# Patient Record
Sex: FEMALE | Race: WHITE
[De-identification: names, ages, dates, MRNs, and addresses within clinical notes are randomized per-mention and may not be internally consistent; named-entity substitution may affect disease eponyms.]

---

## 2018-02-27 ENCOUNTER — HOSPITAL ENCOUNTER (OUTPATIENT)
Dept: HOSPITAL 75 - RAD | Age: 28
End: 2018-02-27
Attending: OBSTETRICS & GYNECOLOGY
Payer: COMMERCIAL

## 2018-02-27 DIAGNOSIS — Z36.89: Primary | ICD-10-CM

## 2018-02-27 DIAGNOSIS — Z3A.19: ICD-10-CM

## 2018-02-27 PROCEDURE — 76805 OB US >/= 14 WKS SNGL FETUS: CPT

## 2018-02-27 NOTE — DIAGNOSTIC IMAGING REPORT
INDICATION: Fetal survey.



TECHNIQUE: Multiple real-time grayscale images were obtained over

the gravid uterus.



COMPARISON: None.



FINDINGS: There is a single live fetus in a breech presentation.

The placenta is anterior. The amniotic fluid volume is normal.

Fetal cardiac activity is measured at 150 beats per minute.

Cervical length is 8.4 cm. Fetal kidneys, bladder, and stomach

are unremarkable. Intracranial structures are unremarkable. There

is a four-chamber heart. There is a three-vessel cord with normal

cord insertion. The fetal spine is unremarkable.



Biometrical measurements are as follows:

Biparietal 4.4 cm, age 19 weeks 3 days.

Head circumference 17.2 cm, age 19 weeks 6 days.

Abdominal circumference 14.0 cm, age 19 weeks 3 days.

Femur length 3.2 cm, age 20 weeks 0 days.



Sonographic estimate age: 19 weeks 5 days.

Sonographic estimated date of delivery: 07/19/18.



Estimated Fetal Weight: 304 gm (+/- 44  gm).

LMP percentile: 20%.



Fetal heart rate: 150 beats per minute.



Fetal number: 1 of 1.



IMPRESSION: Single live IUP at approximately 19 weeks 5 days

gestational age. The estimated date of confinement

sonographically is 07/19/2018.



Dictated by: 



  Dictated on workstation # [id number]

## 2018-07-04 ENCOUNTER — HOSPITAL ENCOUNTER (INPATIENT)
Dept: HOSPITAL 75 - LDRP | Age: 28
LOS: 2 days | Discharge: HOME | End: 2018-07-06
Attending: OBSTETRICS & GYNECOLOGY | Admitting: OBSTETRICS & GYNECOLOGY
Payer: COMMERCIAL

## 2018-07-04 VITALS — SYSTOLIC BLOOD PRESSURE: 129 MMHG | DIASTOLIC BLOOD PRESSURE: 63 MMHG

## 2018-07-04 VITALS — DIASTOLIC BLOOD PRESSURE: 57 MMHG | SYSTOLIC BLOOD PRESSURE: 105 MMHG

## 2018-07-04 VITALS — WEIGHT: 150 LBS | BODY MASS INDEX: 27.6 KG/M2 | HEIGHT: 62 IN

## 2018-07-04 VITALS — DIASTOLIC BLOOD PRESSURE: 62 MMHG | SYSTOLIC BLOOD PRESSURE: 100 MMHG

## 2018-07-04 VITALS — SYSTOLIC BLOOD PRESSURE: 117 MMHG | DIASTOLIC BLOOD PRESSURE: 78 MMHG

## 2018-07-04 VITALS — SYSTOLIC BLOOD PRESSURE: 109 MMHG | DIASTOLIC BLOOD PRESSURE: 56 MMHG

## 2018-07-04 VITALS — SYSTOLIC BLOOD PRESSURE: 115 MMHG | DIASTOLIC BLOOD PRESSURE: 65 MMHG

## 2018-07-04 VITALS — SYSTOLIC BLOOD PRESSURE: 112 MMHG | DIASTOLIC BLOOD PRESSURE: 58 MMHG

## 2018-07-04 VITALS — SYSTOLIC BLOOD PRESSURE: 107 MMHG | DIASTOLIC BLOOD PRESSURE: 60 MMHG

## 2018-07-04 VITALS — DIASTOLIC BLOOD PRESSURE: 58 MMHG | SYSTOLIC BLOOD PRESSURE: 108 MMHG

## 2018-07-04 VITALS — DIASTOLIC BLOOD PRESSURE: 59 MMHG | SYSTOLIC BLOOD PRESSURE: 110 MMHG

## 2018-07-04 VITALS — DIASTOLIC BLOOD PRESSURE: 62 MMHG | SYSTOLIC BLOOD PRESSURE: 101 MMHG

## 2018-07-04 VITALS — DIASTOLIC BLOOD PRESSURE: 56 MMHG | SYSTOLIC BLOOD PRESSURE: 112 MMHG

## 2018-07-04 VITALS — SYSTOLIC BLOOD PRESSURE: 111 MMHG | DIASTOLIC BLOOD PRESSURE: 53 MMHG

## 2018-07-04 VITALS — DIASTOLIC BLOOD PRESSURE: 68 MMHG | SYSTOLIC BLOOD PRESSURE: 114 MMHG

## 2018-07-04 VITALS — SYSTOLIC BLOOD PRESSURE: 100 MMHG | DIASTOLIC BLOOD PRESSURE: 62 MMHG

## 2018-07-04 VITALS — SYSTOLIC BLOOD PRESSURE: 114 MMHG | DIASTOLIC BLOOD PRESSURE: 57 MMHG

## 2018-07-04 VITALS — SYSTOLIC BLOOD PRESSURE: 107 MMHG | DIASTOLIC BLOOD PRESSURE: 59 MMHG

## 2018-07-04 VITALS — SYSTOLIC BLOOD PRESSURE: 111 MMHG | DIASTOLIC BLOOD PRESSURE: 64 MMHG

## 2018-07-04 VITALS — SYSTOLIC BLOOD PRESSURE: 111 MMHG | DIASTOLIC BLOOD PRESSURE: 61 MMHG

## 2018-07-04 VITALS — SYSTOLIC BLOOD PRESSURE: 108 MMHG | DIASTOLIC BLOOD PRESSURE: 70 MMHG

## 2018-07-04 VITALS — DIASTOLIC BLOOD PRESSURE: 61 MMHG | SYSTOLIC BLOOD PRESSURE: 115 MMHG

## 2018-07-04 VITALS — DIASTOLIC BLOOD PRESSURE: 57 MMHG | SYSTOLIC BLOOD PRESSURE: 114 MMHG

## 2018-07-04 VITALS — SYSTOLIC BLOOD PRESSURE: 109 MMHG | DIASTOLIC BLOOD PRESSURE: 58 MMHG

## 2018-07-04 VITALS — DIASTOLIC BLOOD PRESSURE: 58 MMHG | SYSTOLIC BLOOD PRESSURE: 111 MMHG

## 2018-07-04 VITALS — SYSTOLIC BLOOD PRESSURE: 113 MMHG | DIASTOLIC BLOOD PRESSURE: 57 MMHG

## 2018-07-04 VITALS — DIASTOLIC BLOOD PRESSURE: 65 MMHG | SYSTOLIC BLOOD PRESSURE: 112 MMHG

## 2018-07-04 VITALS — DIASTOLIC BLOOD PRESSURE: 56 MMHG | SYSTOLIC BLOOD PRESSURE: 111 MMHG

## 2018-07-04 VITALS — SYSTOLIC BLOOD PRESSURE: 107 MMHG | DIASTOLIC BLOOD PRESSURE: 66 MMHG

## 2018-07-04 VITALS — DIASTOLIC BLOOD PRESSURE: 59 MMHG | SYSTOLIC BLOOD PRESSURE: 111 MMHG

## 2018-07-04 VITALS — DIASTOLIC BLOOD PRESSURE: 60 MMHG | SYSTOLIC BLOOD PRESSURE: 107 MMHG

## 2018-07-04 VITALS — SYSTOLIC BLOOD PRESSURE: 110 MMHG | DIASTOLIC BLOOD PRESSURE: 64 MMHG

## 2018-07-04 VITALS — DIASTOLIC BLOOD PRESSURE: 60 MMHG | SYSTOLIC BLOOD PRESSURE: 111 MMHG

## 2018-07-04 VITALS — DIASTOLIC BLOOD PRESSURE: 57 MMHG | SYSTOLIC BLOOD PRESSURE: 110 MMHG

## 2018-07-04 VITALS — DIASTOLIC BLOOD PRESSURE: 58 MMHG | SYSTOLIC BLOOD PRESSURE: 107 MMHG

## 2018-07-04 VITALS — SYSTOLIC BLOOD PRESSURE: 111 MMHG | DIASTOLIC BLOOD PRESSURE: 60 MMHG

## 2018-07-04 VITALS — SYSTOLIC BLOOD PRESSURE: 120 MMHG | DIASTOLIC BLOOD PRESSURE: 59 MMHG

## 2018-07-04 VITALS — DIASTOLIC BLOOD PRESSURE: 57 MMHG | SYSTOLIC BLOOD PRESSURE: 115 MMHG

## 2018-07-04 DIAGNOSIS — Z3A.38: ICD-10-CM

## 2018-07-04 LAB
BASOPHILS # BLD AUTO: 0 10^3/UL (ref 0–0.1)
BASOPHILS NFR BLD AUTO: 0 % (ref 0–10)
EOSINOPHIL # BLD AUTO: 0 10^3/UL (ref 0–0.3)
EOSINOPHIL NFR BLD AUTO: 0 % (ref 0–10)
ERYTHROCYTE [DISTWIDTH] IN BLOOD BY AUTOMATED COUNT: 13.9 % (ref 10–14.5)
HCT VFR BLD CALC: 38 % (ref 35–52)
HGB BLD-MCNC: 13.4 G/DL (ref 11.5–16)
LYMPHOCYTES # BLD AUTO: 1.6 X 10^3 (ref 1–4)
LYMPHOCYTES NFR BLD AUTO: 9 % (ref 12–44)
MANUAL DIFFERENTIAL PERFORMED BLD QL: YES
MCH RBC QN AUTO: 32 PG (ref 25–34)
MCHC RBC AUTO-ENTMCNC: 35 G/DL (ref 32–36)
MCV RBC AUTO: 92 FL (ref 80–99)
MONOCYTES # BLD AUTO: 0.9 X 10^3 (ref 0–1)
MONOCYTES NFR BLD AUTO: 5 % (ref 0–12)
MONOCYTES NFR BLD: 10 %
NEUTROPHILS # BLD AUTO: 15.2 X 10^3 (ref 1.8–7.8)
NEUTROPHILS NFR BLD AUTO: 86 % (ref 42–75)
NEUTS BAND NFR BLD MANUAL: 78 %
PLATELET # BLD: 242 10^3/UL (ref 130–400)
PMV BLD AUTO: 10.5 FL (ref 7.4–10.4)
RBC # BLD AUTO: 4.16 10^6/UL (ref 4.35–5.85)
RBC MORPH BLD: NORMAL
VARIANT LYMPHS NFR BLD MANUAL: 12 %
WBC # BLD AUTO: 17.7 10^3/UL (ref 4.3–11)

## 2018-07-04 PROCEDURE — 99212 OFFICE O/P EST SF 10 MIN: CPT

## 2018-07-04 PROCEDURE — 86850 RBC ANTIBODY SCREEN: CPT

## 2018-07-04 PROCEDURE — 85025 COMPLETE CBC W/AUTO DIFF WBC: CPT

## 2018-07-04 PROCEDURE — 85007 BL SMEAR W/DIFF WBC COUNT: CPT

## 2018-07-04 PROCEDURE — 88307 TISSUE EXAM BY PATHOLOGIST: CPT

## 2018-07-04 PROCEDURE — 36415 COLL VENOUS BLD VENIPUNCTURE: CPT

## 2018-07-04 PROCEDURE — 86900 BLOOD TYPING SEROLOGIC ABO: CPT

## 2018-07-04 PROCEDURE — 85027 COMPLETE CBC AUTOMATED: CPT

## 2018-07-04 PROCEDURE — 86901 BLOOD TYPING SEROLOGIC RH(D): CPT

## 2018-07-04 RX ADMIN — Medication SCH MLS/HR: at 17:08

## 2018-07-04 RX ADMIN — Medication SCH MLS/HR: at 17:40

## 2018-07-04 RX ADMIN — IBUPROFEN SCH MG: 600 TABLET ORAL at 20:11

## 2018-07-04 NOTE — OB LABOR & DELIVERY RECORD
L&D History


Date of Service


Date of Service:  2018





History


Expected Date of Delivery:  2018


Gestational Age in Weeks:  38


Hx :  1


Hx Para:  0





Pregnancy Complications


Prenatal Events:  Routine Prenatal care


Operative Indications (Cesarea:  N/A-Vaginal Delivery


Intrapartal Events:  None





L&D Stage1


Stage One


Onset of Labor - Date:  2018





Monitors and Tracing


Fetal Monitor Mode:  External


Fetal Monitor Accelerations:  Uniform


Fetal Monitor Decelerations:  Variable


Fetal Station:  -2


Long Term Variability:  Average (6-10)


Short Term Variability:  Present


Fetal Presentation:  Vertex





Rupture of Membranes


Spontaneous Ruture of Membrane:  No


Amniotic Membrane Rupture Time:  14:00


Amniotic Membrane Fluid Desc.:  Clear


Vaginal Bleeding Description:  Normal Show





Induction/Anesthesia


Epidural Cath Placement - Time:  16:00





Progress/Notes


Patient progressed to complete and +2 station with no augmentation other than 

AROM at 6-7 cm.





L&D Stage2


Stage Two


Stage II Date:  2018





Monitors and Tracing


Fetal Monitor Mode:  External


Fetal Monitor Accelerations:  Uniform


Fetal Monitor Decelerations:  Variable


Long Term Variability:  Average (6-10)


Short Term Variability:  Present


Fetal Position:  Right Occiput Anterior


Fetal Presentation:  Vertex





Cord Descript/Complications


Fetal Cord Vessel Description:  3 Vessels





Delivery Type


Infant Delivery Method:  Spontaneous Vaginal


Anterior Shoulder:  Left





Episiotomy/Perineal Laceration


Laceraction(s)/Extensions:  Yes


Episiotomy Description:  Right Mediolateral


Degree (describe repair)


RML repaired using 2-0 and 3-0 vicryl suture





Condition of Infant


Delivery


1 minute Apgar Comment:  


8


5 minute Apgar Comment:  


9


Notes


Live male infant weight 5lbs 14 oz





Condition of Infant


Condition of Infant:  Living


Exam:  No Observed Abnormalities





Resuscitation


Resuscitation:  N/A - Spontaneous Resp





L&D Stage3


Stage Three


Stage III Date:  2018





Pictocin


Pitocin Administration mu/min:  30


Pitocin Administration Comment:  


30 mu wide open at delivery of the placenta





Placenta Delivery


Placenta Delivery:  Spontaneous





Delivery Summary


Summary


Estimated blood loss (mL):  300


Attending at delivery:


Sangita Quevedo DO





Condition of Delivery


Examined:  Cervix Examined, Uterus Explored


Post Partum Hemorrhage:  No


Condition of Mother


stable


Condition of Infant (s)


stable











SANGITA QUEVEDO DO 2018 17:22

## 2018-07-04 NOTE — HISTORY & PHYSICAL-OB
OB - Chief Complaint & HPI


Date/Time


Date of Admission:


Date of Admission:


Time Seen by Provider:  14:20





Chief Complaint/History


OB-Reason for Admission/Chief:  Onset of Labor


Hx :  1


Hx Para:  0


Expected Date of Delivery:  2018


Gestational Age in Weeks:  38


Admission Nurse Assessment Rev:  Yes


History of Labs


A pos


Antibody neg


RI


RPR NR


HBsAg NR


HIV NR


GC neg


GBS neg





Allergies and Home Medications


Allergies


Coded Allergies:  


     codeine (Unverified  Adverse Reaction, Mild, NAUSEA, 4/3/11)





Home Medications


Nitrofurantoin/Nitrofuran Mac 100 Mg Capsule, 1 EACH PO BID, (Reported)


Sulfamethoxazole/Trimethoprim 1 Tab Tablet, 1 TAB PO BID


   Prescribed by: JO-ANN CAVAZOS on 11 0305


[Stacy]  , 1 TAB PO DAILY, (Reported)





Patient Home Medication List


Home Medication List Reviewed:  Yes





OB - History


Hx of Present Pregnancy


Prenatal Care:  Yes


Ultrasounds:  Normal mid trimester US


Obstetrical Complications:  None


Medical Complications:  None





Obstetrical History


Hx :  1


Hx Para:  0





Delivery History


Hx Blood Disorders:  No





Patient Past Medical History





n/a





Social History/Family History


Recent Infectious Disease Expo:  No





OB - Admission Exam


Physical Exam


HEENT:  NCAT


Heart:  Rhythm Normal


Lungs:  Clear


Abdomen:  Gravid


Extremities:  Normal


Reflexes:  Normal


Cervical Dilatation:  5cm


Effacement:  75%


Station:  -1


Membranes:  Intact


Fetal Heart Rate:  130's


Accelerations:  Accelerations Present


Decelerations:  No Decelerations


Short Term Variability:  Present


Long Term Variability:  Average (6-25)


Contractions on Admission:  < 5 Minutes Apart


Intensity:  Moderate


Labs





Laboratory Tests








Test


 18


11:52 Range/Units


 


 


White Blood Count


 17.7 H


 4.3-11.0


10^3/uL


 


Red Blood Count


 4.16 L


 4.35-5.85


10^6/uL


 


Hemoglobin 13.4  11.5-16.0  G/DL


 


Hematocrit 38  35-52  %


 


Mean Corpuscular Volume 92  80-99  FL


 


Mean Corpuscular Hemoglobin 32  25-34  PG


 


Mean Corpuscular Hemoglobin


Concent 35 


 32-36  G/DL





 


Red Cell Distribution Width 13.9  10.0-14.5  %


 


Platelet Count


 242 


 130-400


10^3/uL


 


Mean Platelet Volume 10.5 H 7.4-10.4  FL


 


Neutrophils (%) (Auto) 86 H 42-75  %


 


Lymphocytes (%) (Auto) 9 L 12-44  %


 


Monocytes (%) (Auto) 5  0-12  %


 


Eosinophils (%) (Auto) 0  0-10  %


 


Basophils (%) (Auto) 0  0-10  %


 


Neutrophils # (Auto) 15.2 H 1.8-7.8  X 10^3


 


Lymphocytes # (Auto) 1.6  1.0-4.0  X 10^3


 


Monocytes # (Auto) 0.9  0.0-1.0  X 10^3


 


Eosinophils # (Auto)


 0.0 


 0.0-0.3


10^3/uL


 


Basophils # (Auto)


 0.0 


 0.0-0.1


10^3/uL


 


Neutrophils % (Manual) 78   %


 


Lymphocytes % (Manual) 12   %


 


Monocytes % (Manual) 10   %


 


Blood Morphology Comment NORMAL   











OB - Assessment/Plan/Diagnosis


Assessment


Assessment:  active labor


Admission Dx


28 yo  @ 38 weeks


Active labor


GBS neg


Admission Status:  Inpatient Order (span 2 midnights)


Reason for Inpatient Admission:  


28 yo  @ 38 weeks


Active labor


GBS neg





Plan


Plan:  Expectant Management


Induction Method:  SANGITA REEDER DO 2018 2:24 pm

## 2018-07-05 VITALS — DIASTOLIC BLOOD PRESSURE: 69 MMHG | SYSTOLIC BLOOD PRESSURE: 104 MMHG

## 2018-07-05 VITALS — DIASTOLIC BLOOD PRESSURE: 66 MMHG | SYSTOLIC BLOOD PRESSURE: 101 MMHG

## 2018-07-05 VITALS — SYSTOLIC BLOOD PRESSURE: 94 MMHG | DIASTOLIC BLOOD PRESSURE: 66 MMHG

## 2018-07-05 VITALS — DIASTOLIC BLOOD PRESSURE: 65 MMHG | SYSTOLIC BLOOD PRESSURE: 97 MMHG

## 2018-07-05 VITALS — SYSTOLIC BLOOD PRESSURE: 108 MMHG | DIASTOLIC BLOOD PRESSURE: 68 MMHG

## 2018-07-05 VITALS — DIASTOLIC BLOOD PRESSURE: 59 MMHG | SYSTOLIC BLOOD PRESSURE: 106 MMHG

## 2018-07-05 LAB
BASOPHILS # BLD AUTO: 0 10^3/UL (ref 0–0.1)
BASOPHILS NFR BLD AUTO: 0 % (ref 0–10)
EOSINOPHIL # BLD AUTO: 0.1 10^3/UL (ref 0–0.3)
EOSINOPHIL NFR BLD AUTO: 0 % (ref 0–10)
ERYTHROCYTE [DISTWIDTH] IN BLOOD BY AUTOMATED COUNT: 13.9 % (ref 10–14.5)
HCT VFR BLD CALC: 33 % (ref 35–52)
HGB BLD-MCNC: 11.1 G/DL (ref 11.5–16)
LYMPHOCYTES # BLD AUTO: 2.1 X 10^3 (ref 1–4)
LYMPHOCYTES NFR BLD AUTO: 14 % (ref 12–44)
MANUAL DIFFERENTIAL PERFORMED BLD QL: NO
MCH RBC QN AUTO: 32 PG (ref 25–34)
MCHC RBC AUTO-ENTMCNC: 34 G/DL (ref 32–36)
MCV RBC AUTO: 93 FL (ref 80–99)
MONOCYTES # BLD AUTO: 1 X 10^3 (ref 0–1)
MONOCYTES NFR BLD AUTO: 7 % (ref 0–12)
NEUTROPHILS # BLD AUTO: 11.3 X 10^3 (ref 1.8–7.8)
NEUTROPHILS NFR BLD AUTO: 78 % (ref 42–75)
PLATELET # BLD: 209 10^3/UL (ref 130–400)
PMV BLD AUTO: 10.3 FL (ref 7.4–10.4)
RBC # BLD AUTO: 3.51 10^6/UL (ref 4.35–5.85)
WBC # BLD AUTO: 14.4 10^3/UL (ref 4.3–11)

## 2018-07-05 RX ADMIN — DOCUSATE SODIUM SCH MG: 100 CAPSULE ORAL at 02:40

## 2018-07-05 RX ADMIN — IBUPROFEN SCH MG: 600 TABLET ORAL at 15:12

## 2018-07-05 RX ADMIN — IBUPROFEN SCH MG: 600 TABLET ORAL at 02:40

## 2018-07-05 RX ADMIN — DOCUSATE SODIUM SCH MG: 100 CAPSULE ORAL at 08:53

## 2018-07-05 RX ADMIN — IBUPROFEN SCH MG: 600 TABLET ORAL at 08:54

## 2018-07-05 RX ADMIN — VITAMIN A ACETATE, .BETA.-CAROTENE, ASCORBIC ACID, CHOLECALCIFEROL, .ALPHA.-TOCOPHEROL ACETATE, DL-, THIAMINE MONONITRATE, RIBOFLAVIN, NIACINAMIDE, PYRIDOXINE HYDROCHLORIDE, FOLIC ACID, CYANOCOBALAMIN, CALCIUM CARBONATE, FERROUS FUMARATE, ZINC OXIDE, AND CUPRIC OXIDE SCH EA: 2000; 2000; 120; 400; 22; 1.84; 3; 20; 10; 1; 12; 200; 27; 25; 2 TABLET ORAL at 08:53

## 2018-07-05 RX ADMIN — FERROUS SULFATE TAB 325 MG (65 MG ELEMENTAL FE) SCH MG: 325 (65 FE) TAB at 08:53

## 2018-07-05 RX ADMIN — IBUPROFEN SCH MG: 600 TABLET ORAL at 21:33

## 2018-07-05 RX ADMIN — DOCUSATE SODIUM SCH MG: 100 CAPSULE ORAL at 21:33

## 2018-07-05 NOTE — POSTPARTUM PROGRESS NOTE
Postpartum Note


Postpartum Note


Postpartum Day # 1





Subjective:


Patient is without complaints. Ambulating, voiding. Tolerating a regular diet 

without nausea or vomiting. Normal lochia. Pain is well controlled with oral 

pain medications. breastfeeding





Objective:





Vital Sign - Last 24 Hours








 7/4/18 7/4/18 7/4/18 7/4/18





 11:00 14:03 15:03 15:06


 


Temp 98.0   


 


Pulse 79 70 72 76


 


Resp 18 18 18 18


 


B/P (MAP) 108/70 (83) 100/62 (75) 100/62 (75) 115/61 (79)


 


Pulse Ox   99 92


 


O2 Delivery   Room Air Room Air


 


    





 7/4/18 7/4/18 7/4/18 7/4/18





 15:09 15:12 15:15 15:18


 


Temp    98.0


 


Pulse 75 71 78 80


 


Resp 18 18 18 18


 


B/P (MAP) 112/58 (76) 112/56 (74) 111/53 (72) 114/68 (83)


 


Pulse Ox 99 100 100 90


 


O2 Delivery Room Air Room Air Room Air Room Air


 


    





 7/4/18 7/4/18 7/4/18 7/4/18





 15:21 15:24 15:27 15:30


 


Pulse 76 87 78 75


 


Resp 18 18 18 18


 


B/P (MAP) 110/64 (79) 107/60 (76) 107/58 (74) 105/57 (73)


 


Pulse Ox  100 100 99


 


O2 Delivery Room Air Room Air Room Air Room Air





 7/4/18 7/4/18 7/4/18 7/4/18





 15:33 15:36 15:39 15:42


 


Pulse 73 79 77 74


 


Resp 18 18 18 18


 


B/P (MAP) 105/57 (73) 109/58 (75) 110/59 (76) 110/57 (74)


 


Pulse Ox 99 99 99 99


 


O2 Delivery Room Air Room Air Room Air Room Air





 7/4/18 7/4/18 7/4/18 7/4/18





 15:45 15:48 15:51 15:54


 


Pulse 81 97 108 101


 


Resp 18 18 18 18


 


B/P (MAP) 111/59 (76) 108/58 (75) 113/57 (75) 112/58 (76)


 


Pulse Ox 99 98 98 100


 


O2 Delivery Room Air Room Air Room Air Room Air





 7/4/18 7/4/18 7/4/18 7/4/18





 15:57 16:00 16:03 16:06


 


Pulse 97 94 78 81


 


Resp 18 18 18 18


 


B/P (MAP) 115/57 (76) 114/57 (76) 120/59 (79) 114/57 (76)


 


Pulse Ox 100 100 99 99


 


O2 Delivery Room Air Room Air Room Air Room Air





 7/4/18 7/4/18 7/4/18 7/4/18





 16:09 16:12 16:15 16:25


 


Pulse 86 74 69 74


 


Resp 18 18 18 18


 


B/P (MAP) 111/56 (74) 109/56 (73) 111/58 (75) 111/60 (77)


 


Pulse Ox 100 100 100 100


 


O2 Delivery Room Air Room Air Room Air Room Air





 7/4/18 7/4/18 7/4/18 7/4/18





 16:40 16:55 19:23 19:37


 


Pulse 74 107 72 73


 


Resp 18 18 18 


 


B/P (MAP) 111/64 (80) 129/63 (85) 115/65 (82) 111/61 (78)


 


Pulse Ox 100 100  


 


O2 Delivery Room Air Room Air Room Air Room Air





 7/4/18 7/4/18 7/4/18 7/4/18





 19:52 20:08 20:22 20:37


 


Temp   98.6 


 


Pulse 105 79 80 84


 


B/P (MAP) 107/66 (80) 107/59 (75) 112/65 (81) 107/60 (76)


 


O2 Delivery Room Air Room Air Room Air Room Air


 


    





 7/4/18 7/4/18 7/4/18 7/5/18





 20:52 21:08 21:23 00:25


 


Temp    97.5


 


Pulse 85 91 81 89


 


Resp    18


 


B/P (MAP) 111/60 (77) 117/78 (91) 101/62 (75) 108/68 (81)


 


Pulse Ox    98


 


O2 Delivery Room Air Room Air Room Air 


 


    





 7/5/18   





 06:00   


 


Temp 97.5   


 


Pulse 67   


 


Resp 18   


 


B/P (MAP) 94/66 (75)   


 


Pulse Ox 99   














Intake and Output   


 


 7/4/18 7/4/18 7/5/18





 15:00 23:00 07:00


 


Intake Total  2750 ml 


 


Balance  2750 ml 











Physical Exam:


General - Alert and oriented, no apparent distress


Abdomen - Soft, appropriately tender to palpation, non-distended, fundus firm 

at umbilicus


Extremities - no edema, negative Shelton's bilaterally 








Assessment:


PPD 1 NVD


Acute blood loss anemia








Plan:


Routine postpartum care.


Encourage breast feeding.


Encourage ambulation.


Ferrous sulfate supplementation.


Plan for discharge later today





Vitals - Labs


Vital Signs - I&O





Vital Signs








  Date Time  Temp Pulse Resp B/P (MAP) Pulse Ox O2 Delivery O2 Flow Rate FiO2


 


7/5/18 06:00 97.5 67 18 94/66 (75) 99   


 


7/5/18 00:25 97.5 89 18 108/68 (81) 98   


 


7/4/18 21:23  81  101/62 (75)  Room Air  


 


7/4/18 21:08  91  117/78 (91)  Room Air  


 


7/4/18 20:52  85  111/60 (77)  Room Air  


 


7/4/18 20:37  84  107/60 (76)  Room Air  


 


7/4/18 20:22 98.6 80  112/65 (81)  Room Air  


 


7/4/18 20:08  79  107/59 (75)  Room Air  


 


7/4/18 19:52  105  107/66 (80)  Room Air  


 


7/4/18 19:37  73  111/61 (78)  Room Air  


 


7/4/18 19:23  72 18 115/65 (82)  Room Air  


 


7/4/18 16:55  107 18 129/63 (85) 100 Room Air  


 


7/4/18 16:40  74 18 111/64 (80) 100 Room Air  


 


7/4/18 16:25  74 18 111/60 (77) 100 Room Air  


 


7/4/18 16:15  69 18 111/58 (75) 100 Room Air  


 


7/4/18 16:12  74 18 109/56 (73) 100 Room Air  


 


7/4/18 16:09  86 18 111/56 (74) 100 Room Air  


 


7/4/18 16:06  81 18 114/57 (76) 99 Room Air  


 


7/4/18 16:03  78 18 120/59 (79) 99 Room Air  


 


7/4/18 16:00  94 18 114/57 (76) 100 Room Air  


 


7/4/18 15:57  97 18 115/57 (76) 100 Room Air  


 


7/4/18 15:54  101 18 112/58 (76) 100 Room Air  


 


7/4/18 15:51  108 18 113/57 (75) 98 Room Air  


 


7/4/18 15:48  97 18 108/58 (75) 98 Room Air  


 


7/4/18 15:45  81 18 111/59 (76) 99 Room Air  


 


7/4/18 15:42  74 18 110/57 (74) 99 Room Air  


 


7/4/18 15:39  77 18 110/59 (76) 99 Room Air  


 


7/4/18 15:36  79 18 109/58 (75) 99 Room Air  


 


7/4/18 15:33  73 18 105/57 (73) 99 Room Air  


 


7/4/18 15:30  75 18 105/57 (73) 99 Room Air  


 


7/4/18 15:27  78 18 107/58 (74) 100 Room Air  


 


7/4/18 15:24  87 18 107/60 (76) 100 Room Air  


 


7/4/18 15:21  76 18 110/64 (79)  Room Air  


 


7/4/18 15:18 98.0 80 18 114/68 (83) 90 Room Air  


 


7/4/18 15:15  78 18 111/53 (72) 100 Room Air  


 


7/4/18 15:12  71 18 112/56 (74) 100 Room Air  


 


7/4/18 15:09  75 18 112/58 (76) 99 Room Air  


 


7/4/18 15:06  76 18 115/61 (79) 92 Room Air  


 


7/4/18 15:03  72 18 100/62 (75) 99 Room Air  


 


7/4/18 14:03  70 18 100/62 (75)    


 


7/4/18 11:00 98.0 79 18 108/70 (83)    














I & O 


 


 7/5/18





 07:00


 


Intake Total 2750 ml


 


Balance 2750 ml











Labs


Laboratory Tests


7/4/18 11:52: 


White Blood Count 17.7H, Red Blood Count 4.16L, Hemoglobin 13.4, Hematocrit 38, 

Mean Corpuscular Volume 92, Mean Corpuscular Hemoglobin 32, Mean Corpuscular 

Hemoglobin Concent 35, Red Cell Distribution Width 13.9, Platelet Count 242, 

Mean Platelet Volume 10.5H, Neutrophils (%) (Auto) 86H, Lymphocytes (%) (Auto) 

9L, Monocytes (%) (Auto) 5, Eosinophils (%) (Auto) 0, Basophils (%) (Auto) 0, 

Neutrophils # (Auto) 15.2H, Lymphocytes # (Auto) 1.6, Monocytes # (Auto) 0.9, 

Eosinophils # (Auto) 0.0, Basophils # (Auto) 0.0, Neutrophils % (Manual) 78, 

Lymphocytes % (Manual) 12, Monocytes % (Manual) 10, Blood Morphology Comment 

NORMAL


7/5/18 06:07: 


White Blood Count 14.4H, Red Blood Count 3.51L, Hemoglobin 11.1L, Hematocrit 33L

, Mean Corpuscular Volume 93, Mean Corpuscular Hemoglobin 32, Mean Corpuscular 

Hemoglobin Concent 34, Red Cell Distribution Width 13.9, Platelet Count 209, 

Mean Platelet Volume 10.3, Neutrophils (%) (Auto) 78H, Lymphocytes (%) (Auto) 14

, Monocytes (%) (Auto) 7, Eosinophils (%) (Auto) 0, Basophils (%) (Auto) 0, 

Neutrophils # (Auto) 11.3H, Lymphocytes # (Auto) 2.1, Monocytes # (Auto) 1.0, 

Eosinophils # (Auto) 0.1, Basophils # (Auto) 0.0











SANGITA QUEVEDO DO Jul 5, 2018 7:54 am

## 2018-07-05 NOTE — ANESTHESIA-REGIONAL POST-OP
Regional


Patient Condition


Mental Status:  Alert, Oriented x3


Circulation:  Same as Pre-Op


Headache:  Absent


Sensation:  Full Recovery


Motor Block:  Absent





Post Op Complications


Complications


None





Follow Up Care/Instructions


Patient Instructions


None needed.





Anesthesia/Patient Condition


Patient is doing well, no complaints, stable vital signs, no apparent adverse 

anesthesia problems.   


No complications reported per nursing.


D/C home per [facility] Criteria:  No











AUDRA NOVOA CRNA Jul 5, 2018 08:53

## 2018-07-06 VITALS — DIASTOLIC BLOOD PRESSURE: 73 MMHG | SYSTOLIC BLOOD PRESSURE: 110 MMHG

## 2018-07-06 VITALS — SYSTOLIC BLOOD PRESSURE: 108 MMHG | DIASTOLIC BLOOD PRESSURE: 70 MMHG

## 2018-07-06 RX ADMIN — DOCUSATE SODIUM SCH MG: 100 CAPSULE ORAL at 08:32

## 2018-07-06 RX ADMIN — FERROUS SULFATE TAB 325 MG (65 MG ELEMENTAL FE) SCH MG: 325 (65 FE) TAB at 08:32

## 2018-07-06 RX ADMIN — IBUPROFEN SCH MG: 600 TABLET ORAL at 08:32

## 2018-07-06 RX ADMIN — IBUPROFEN SCH MG: 600 TABLET ORAL at 03:47

## 2018-07-06 RX ADMIN — VITAMIN A ACETATE, .BETA.-CAROTENE, ASCORBIC ACID, CHOLECALCIFEROL, .ALPHA.-TOCOPHEROL ACETATE, DL-, THIAMINE MONONITRATE, RIBOFLAVIN, NIACINAMIDE, PYRIDOXINE HYDROCHLORIDE, FOLIC ACID, CYANOCOBALAMIN, CALCIUM CARBONATE, FERROUS FUMARATE, ZINC OXIDE, AND CUPRIC OXIDE SCH EA: 2000; 2000; 120; 400; 22; 1.84; 3; 20; 10; 1; 12; 200; 27; 25; 2 TABLET ORAL at 08:32

## 2018-07-06 NOTE — POSTPARTUM PROGRESS NOTE
Postpartum Note


Postpartum Note


Postpartum Day #2  s/p ; dc held yesterday as baby not discharged. 





Subjective:


Patient is without complaints. Ambulating, voiding. Tolerating a regular diet 

without nausea or vomiting. Normal lochia. Pain is well controlled with oral 

pain medications. breast feeding





Objective:











 18





 21:33 03:45 08:30


 


Temp 98.0 97.8 97.5


 


Pulse 70 102 91


 


Resp 18 16 16


 


B/P (MAP) 106/59 (75) 108/70 (83) 110/73 (85)


 


Pulse Ox 100 99 99


 


O2 Delivery Room Air Room Air Room Air











Physical Exam:


General - Alert and oriented, no apparent distress


Abdomen - Soft, appropriately tender to palpation, non-distended, fundus firm 

at umbilicus


Extremities - no edema, negative Shelton's bilaterally 








Assessment:


1. post-partum day # 2, status post spont vaginal delivery.


Recovering well, hemodynamically stable











Plan:


Routine postpartum care.


Encourage breast feeding.


Encourage ambulation.


Ferrous sulfate supplementation.


Plan for discharge today





Vitals - Labs


Vital Signs - I&O





Vital Signs








  Date Time  Temp Pulse Resp B/P (MAP) Pulse Ox O2 Delivery O2 Flow Rate FiO2


 


18 08:30 97.5 91 16 110/73 (85) 99 Room Air  


 


18 03:45 97.8 102 16 108/70 (83) 99 Room Air  


 


18 21:33 98.0 70 18 106/59 (75) 100 Room Air  


 


18 15:11 97.8 71 18 101/66 (78)  Room Air  


 


18 12:30 97.8 64 18 97/65 (76) 99 Room Air  

















CHRIS RAPHAEL DO 2018 09:23

## 2018-12-04 ENCOUNTER — HOSPITAL ENCOUNTER (OUTPATIENT)
Dept: HOSPITAL 75 - RAD | Age: 28
End: 2018-12-04
Attending: OTOLARYNGOLOGY
Payer: COMMERCIAL

## 2018-12-04 DIAGNOSIS — R59.0: Primary | ICD-10-CM

## 2018-12-04 PROCEDURE — 76536 US EXAM OF HEAD AND NECK: CPT

## 2018-12-04 NOTE — DIAGNOSTIC IMAGING REPORT
INDICATION: Bilateral neck lymphadenopathy for one year.



Sonographic interrogation of the soft tissue neck was performed.

The left neck does show a lymph node measuring 2.0 x 0.4 x 0.7

cm. No other enlarged nodes are identified. No fluid collections

are identified.



IMPRESSION: Mildly prominent left neck lymph node. The study is

otherwise unremarkable.



Dictated by: 



  Dictated on workstation # [id number]

## 2018-12-11 ENCOUNTER — HOSPITAL ENCOUNTER (OUTPATIENT)
Dept: HOSPITAL 75 - RAD | Age: 28
End: 2018-12-11
Attending: OTOLARYNGOLOGY
Payer: COMMERCIAL

## 2018-12-11 DIAGNOSIS — R59.0: Primary | ICD-10-CM

## 2018-12-11 DIAGNOSIS — Z90.89: ICD-10-CM

## 2018-12-11 PROCEDURE — 70491 CT SOFT TISSUE NECK W/DYE: CPT

## 2018-12-11 NOTE — DIAGNOSTIC IMAGING REPORT
PROCEDURE: CT neck soft tissue with contrast.



TECHNIQUE: Multiple contiguous axial images were obtained through

the neck after the administration of contrast.



INDICATION: Cervical lymphadenopathy. History of left-sided

lymphadenectomy.



COMPARISON: Ultrasound neck 12/04/2018.



FINDINGS: No cervical lymphadenopathy. No mass or fluid

collection in the neck. The thyroid and major salivary glands are

negative. The pharyngeal and laryngeal soft tissues are symmetric

without suspicious mass or enhancement. Normal tongue base, floor

of the mouth and epiglottis. The cervical carotid and vertebral

arteries are grossly patent. Mucosal thickening in the right

maxillary sinus is partially visualized. Reversal of cervical

lordosis is likely positional. No acute CT findings in the

cervical spine.



IMPRESSION: No cervical lymphadenopathy. No cervical mass or

fluid collection.



Dictated by: 



  Dictated on workstation # [id number]

## 2022-02-09 ENCOUNTER — HOSPITAL ENCOUNTER (OUTPATIENT)
Dept: HOSPITAL 75 - RAD | Age: 32
End: 2022-02-09
Attending: NURSE PRACTITIONER
Payer: COMMERCIAL

## 2022-02-09 DIAGNOSIS — Z34.02: Primary | ICD-10-CM

## 2022-02-09 DIAGNOSIS — Z3A.20: ICD-10-CM

## 2022-02-09 PROCEDURE — 76805 OB US >/= 14 WKS SNGL FETUS: CPT

## 2022-02-09 NOTE — DIAGNOSTIC IMAGING REPORT
INDICATION: Fetal survey.



TECHNIQUE: Multiple real-time grayscale images were obtained over

the gravid uterus.



COMPARISON: None



FINDINGS: There is a single live fetus in a transverse

presentation head to the maternal right. Fetal heart rate was

recorded at 158 bpm. Placenta is anterior. Amniotic fluid volume

is 9.1 cm. Fetal survey demonstrates fetal kidneys to have slight

prominence of the renal pelvis measuring 2 to 3 mm in size. The

fetal bladder and stomach are unremarkable. Fetal brain is

unremarkable. There is a four-chamber heart. There is a

three-vessel cord with normal insertion. Fetal spine images are

somewhat limited due to fetal position. Cervical length is

approximately 8 cm.



Biometrical measurements are as follows:

Biparietal 4.71 cm, age 20 weeks 2 days.

Head circumference 18.14 cm, age 20 weeks 4 days.

Abdominal circumference 15.50 cm, age 20 weeks 5 days.

Femur length 3.18 cm, age 20 weeks 0 days.



Sonographic estimate age: 20 weeks 3 days.

Sonographic estimated date of delivery: 06/26/2022.



Estimated Fetal Weight: 347 gm (+/- 51  gm).

LMP percentile: 40%.



Fetal heart rate: 158 beats per minute.



Fetal number: 1 of 1.



IMPRESSION: Single live IUP 20 weeks 3 days gestational age with

estimated date of confinement sonographically 06/26/2022. Fetal

spine evaluation is somewhat limited due to fetal position.



Dictated by: 



  Dictated on workstation # [id number]

## 2022-06-21 ENCOUNTER — HOSPITAL ENCOUNTER (INPATIENT)
Dept: HOSPITAL 75 - WSO | Age: 32
LOS: 3 days | Discharge: HOME | End: 2022-06-24
Attending: OBSTETRICS & GYNECOLOGY | Admitting: OBSTETRICS & GYNECOLOGY
Payer: COMMERCIAL

## 2022-06-21 VITALS — HEIGHT: 62.01 IN | WEIGHT: 160.5 LBS | BODY MASS INDEX: 29.16 KG/M2

## 2022-06-21 DIAGNOSIS — Z3A.39: ICD-10-CM

## 2022-06-21 DIAGNOSIS — Z88.5: ICD-10-CM

## 2022-06-21 PROCEDURE — 85025 COMPLETE CBC W/AUTO DIFF WBC: CPT

## 2022-06-21 PROCEDURE — 85027 COMPLETE CBC AUTOMATED: CPT

## 2022-06-21 PROCEDURE — 85007 BL SMEAR W/DIFF WBC COUNT: CPT

## 2022-06-21 PROCEDURE — 86850 RBC ANTIBODY SCREEN: CPT

## 2022-06-21 PROCEDURE — 36415 COLL VENOUS BLD VENIPUNCTURE: CPT

## 2022-06-21 PROCEDURE — 86901 BLOOD TYPING SEROLOGIC RH(D): CPT

## 2022-06-21 PROCEDURE — 86900 BLOOD TYPING SEROLOGIC ABO: CPT

## 2022-06-21 PROCEDURE — 99212 OFFICE O/P EST SF 10 MIN: CPT

## 2022-06-22 VITALS — DIASTOLIC BLOOD PRESSURE: 70 MMHG | SYSTOLIC BLOOD PRESSURE: 122 MMHG

## 2022-06-22 VITALS — SYSTOLIC BLOOD PRESSURE: 97 MMHG | DIASTOLIC BLOOD PRESSURE: 54 MMHG

## 2022-06-22 VITALS — SYSTOLIC BLOOD PRESSURE: 108 MMHG | DIASTOLIC BLOOD PRESSURE: 52 MMHG

## 2022-06-22 VITALS — SYSTOLIC BLOOD PRESSURE: 110 MMHG | DIASTOLIC BLOOD PRESSURE: 61 MMHG

## 2022-06-22 VITALS — DIASTOLIC BLOOD PRESSURE: 51 MMHG | SYSTOLIC BLOOD PRESSURE: 93 MMHG

## 2022-06-22 VITALS — SYSTOLIC BLOOD PRESSURE: 108 MMHG | DIASTOLIC BLOOD PRESSURE: 57 MMHG

## 2022-06-22 VITALS — DIASTOLIC BLOOD PRESSURE: 56 MMHG | SYSTOLIC BLOOD PRESSURE: 103 MMHG

## 2022-06-22 VITALS — DIASTOLIC BLOOD PRESSURE: 56 MMHG | SYSTOLIC BLOOD PRESSURE: 99 MMHG

## 2022-06-22 VITALS — SYSTOLIC BLOOD PRESSURE: 106 MMHG | DIASTOLIC BLOOD PRESSURE: 59 MMHG

## 2022-06-22 VITALS — SYSTOLIC BLOOD PRESSURE: 108 MMHG | DIASTOLIC BLOOD PRESSURE: 56 MMHG

## 2022-06-22 VITALS — SYSTOLIC BLOOD PRESSURE: 92 MMHG | DIASTOLIC BLOOD PRESSURE: 53 MMHG

## 2022-06-22 VITALS — DIASTOLIC BLOOD PRESSURE: 59 MMHG | SYSTOLIC BLOOD PRESSURE: 111 MMHG

## 2022-06-22 VITALS — SYSTOLIC BLOOD PRESSURE: 113 MMHG | DIASTOLIC BLOOD PRESSURE: 60 MMHG

## 2022-06-22 VITALS — SYSTOLIC BLOOD PRESSURE: 113 MMHG | DIASTOLIC BLOOD PRESSURE: 62 MMHG

## 2022-06-22 VITALS — SYSTOLIC BLOOD PRESSURE: 94 MMHG | DIASTOLIC BLOOD PRESSURE: 47 MMHG

## 2022-06-22 VITALS — DIASTOLIC BLOOD PRESSURE: 56 MMHG | SYSTOLIC BLOOD PRESSURE: 101 MMHG

## 2022-06-22 VITALS — DIASTOLIC BLOOD PRESSURE: 59 MMHG | SYSTOLIC BLOOD PRESSURE: 110 MMHG

## 2022-06-22 VITALS — DIASTOLIC BLOOD PRESSURE: 57 MMHG | SYSTOLIC BLOOD PRESSURE: 106 MMHG

## 2022-06-22 VITALS — SYSTOLIC BLOOD PRESSURE: 116 MMHG | DIASTOLIC BLOOD PRESSURE: 65 MMHG

## 2022-06-22 VITALS — DIASTOLIC BLOOD PRESSURE: 53 MMHG | SYSTOLIC BLOOD PRESSURE: 118 MMHG

## 2022-06-22 VITALS — SYSTOLIC BLOOD PRESSURE: 104 MMHG | DIASTOLIC BLOOD PRESSURE: 59 MMHG

## 2022-06-22 VITALS — DIASTOLIC BLOOD PRESSURE: 54 MMHG | SYSTOLIC BLOOD PRESSURE: 95 MMHG

## 2022-06-22 VITALS — SYSTOLIC BLOOD PRESSURE: 104 MMHG | DIASTOLIC BLOOD PRESSURE: 56 MMHG

## 2022-06-22 VITALS — SYSTOLIC BLOOD PRESSURE: 122 MMHG | DIASTOLIC BLOOD PRESSURE: 70 MMHG

## 2022-06-22 LAB
BASOPHILS # BLD AUTO: 0 10^3/UL (ref 0–0.1)
BASOPHILS NFR BLD AUTO: 0 % (ref 0–10)
EOSINOPHIL # BLD AUTO: 0.1 10^3/UL (ref 0–0.3)
EOSINOPHIL NFR BLD AUTO: 0 % (ref 0–10)
HCT VFR BLD CALC: 42 % (ref 35–52)
HGB BLD-MCNC: 13.8 G/DL (ref 11.5–16)
LYMPHOCYTES # BLD AUTO: 2.2 10^3/UL (ref 1–4)
LYMPHOCYTES NFR BLD AUTO: 14 % (ref 12–44)
MANUAL DIFFERENTIAL PERFORMED BLD QL: YES
MCH RBC QN AUTO: 31 PG (ref 25–34)
MCHC RBC AUTO-ENTMCNC: 33 G/DL (ref 32–36)
MCV RBC AUTO: 93 FL (ref 80–99)
MONOCYTES # BLD AUTO: 0.8 10^3/UL (ref 0–1)
MONOCYTES NFR BLD AUTO: 5 % (ref 0–12)
MONOCYTES NFR BLD: 3 %
NEUTROPHILS # BLD AUTO: 13.2 10^3/UL (ref 1.8–7.8)
NEUTROPHILS NFR BLD AUTO: 81 % (ref 42–75)
NEUTS BAND NFR BLD MANUAL: 85 %
PLATELET # BLD EST: (no result) 10*3/UL
PLATELET # BLD: 310 10^3/UL (ref 130–400)
PMV BLD AUTO: 10 FL (ref 9–12.2)
POIKILOCYTOSIS BLD QL SMEAR: SLIGHT
POLYCHROMASIA BLD QL SMEAR: SLIGHT
VARIANT LYMPHS NFR BLD MANUAL: 12 %
WBC # BLD AUTO: 16.3 10^3/UL (ref 4.3–11)

## 2022-06-22 PROCEDURE — 0HQ9XZZ REPAIR PERINEUM SKIN, EXTERNAL APPROACH: ICD-10-PCS | Performed by: OBSTETRICS & GYNECOLOGY

## 2022-06-22 RX ADMIN — WITCH HAZEL PRN EA: 500 SOLUTION RECTAL; TOPICAL at 09:20

## 2022-06-22 RX ADMIN — VITAMIN A ACETATE, .BETA.-CAROTENE, ASCORBIC ACID, CHOLECALCIFEROL, .ALPHA.-TOCOPHEROL ACETATE, DL-, THIAMINE MONONITRATE, RIBOFLAVIN, NIACINAMIDE, PYRIDOXINE HYDROCHLORIDE, FOLIC ACID, CYANOCOBALAMIN, CALCIUM CARBONATE, FERROUS FUMARATE, ZINC OXIDE, AND CUPRIC OXIDE SCH EA: 2000; 2000; 120; 400; 22; 1.84; 3; 20; 10; 1; 12; 200; 27; 25; 2 TABLET ORAL at 09:19

## 2022-06-22 RX ADMIN — IBUPROFEN SCH MG: 600 TABLET ORAL at 21:29

## 2022-06-22 RX ADMIN — Medication SCH MLS/HR: at 05:15

## 2022-06-22 RX ADMIN — Medication SCH MLS/HR: at 06:01

## 2022-06-22 RX ADMIN — DOCUSATE SODIUM SCH MG: 100 CAPSULE ORAL at 21:29

## 2022-06-22 RX ADMIN — DOCUSATE SODIUM SCH MG: 100 CAPSULE ORAL at 09:19

## 2022-06-22 RX ADMIN — IBUPROFEN SCH MG: 600 TABLET ORAL at 16:18

## 2022-06-22 RX ADMIN — FERROUS SULFATE TAB 325 MG (65 MG ELEMENTAL FE) SCH MG: 325 (65 FE) TAB at 09:19

## 2022-06-22 NOTE — HISTORY & PHYSICAL-OB
OB - Chief Complaint & HPI


Date/Time


Date of Admission:


Date of Admission:  2022 at 00:00


Date seen by a Provider:  2022


Time Seen by a Provider:  05:00





Chief Complaint/History


OB-Reason for Admission/Chief:  Onset of Labor


Hx :  2


Hx Para:  1


Expected Date of Delivery:  2022


Gestational Age in Weeks:  39


Gestational Age in Days:  3


Admission Nurse Assessment Rev:  Yes


History of Labs


see PN labs


GBS neg





Allergies and Home Medications


Allergies


Coded Allergies:  


     codeine (Unverified  Adverse Reaction, Mild, NAUSEA, 4/3/11)





Patient Home Medication List


Home Medication List Reviewed:  Yes


Dibucaine (Dibucaine) 30 Gm Oint, 0 GM TOP UD PRN for PAIN- SEE INSTRUCTIONS


   Prescribed by: SANGITA QUEVEDO on 18


Docusate Sodium (Docusate Sodium) 100 Mg Capsule, 100 MG PO BID PRN for 

CONSTIPATION-1ST LINE


   Prescribed by: SANGITA QUEVEDO on 18


Ferrous Sulfate (Ferrous Sulfate) 325 Mg Tablet, 325 MG PO DAILY


   Prescribed by: SANGITA QUEVEDO on 18


Hydrocodone Bit/Acetaminophen (Lortab  5 Mg Tablet) 1 Tab Tab, 1-2 TAB PO Q4H 

PRN for PAIN-MODERATE


   Prescribed by: SANGITA QUEVEDO on 18


Ibuprofen (Ibu) 600 Mg Tablet, 600 MG PO Q6H


   Prescribed by: SANGITA QUEVEDO on 18


[Benzocaine/Menthol] 56 ML AEROSOL, 56 ML TP UD PRN for PAIN- SEE INSTRUCTIONS


   Prescribed by: SANGITA QUEVEDO on 18





OB - History


Hx of Present Pregnancy


Prenatal Care:  Yes


Ultrasounds:  Normal mid trimester US


Obstetrical Complications:  None


Medical Complications:  None





Obstetrical History


Hx :  2


Hx Para:  1





Delivery History


Hx Blood Disorders:  No





Patient Past Medical History





n/a





Social History/Family History


Alcohol Use:  Denies Use


Recreational Drug Use:  No


2nd Hand Smoke Exposure:  No





Immunizations


Influenza Vaccine Up-to-Date:  Yes; Up-to-Date


Hepatitis A:  Yes


Hepatitis B:  Yes





OB - Admission Exam


Physical Exam


Vitals:





Vital Signs








 22





 02:30 04:15


 


Temp 36.6 


 


Pulse  74


 


Resp  18


 


B/P (MAP)  104/59 (74)


 


Pulse Ox  99


 


O2 Delivery  Room Air








HEENT:  NCAT


Heart:  Rhythm Normal


Lungs:  Clear


Abdomen:  Gravid


Extremities:  Normal


Reflexes:  Normal


Cervical Dilatation:  6cm


Effacement:  75%


Station:  -1


Membranes:  Intact


Fetal Heart Rate:  130's


Accelerations:  Accelerations Present


Decelerations:  No Decelerations


Short Term Variability:  Present


Long Term Variability:  Average (6-25)


Contractions on Admission:  < 5 Minutes Apart


Intensity:  Firm


Labs





Laboratory Tests








Test


 22


00:45 Range/Units


 


 


White Blood Count


 16.3 H


 4.3-11.0


10^3/uL


 


Red Blood Count


 4.48 


 3.80-5.11


10^6/uL


 


Hemoglobin 13.8  11.5-16.0  g/dL


 


Hematocrit 42  35-52  %


 


Mean Corpuscular Volume 93  80-99  fL


 


Mean Corpuscular Hemoglobin 31  25-34  pg


 


Mean Corpuscular Hemoglobin


Concent 33 


 32-36  g/dL





 


Red Cell Distribution Width 13.8  10.0-14.5  %


 


Platelet Count


 310 


 130-400


10^3/uL


 


Mean Platelet Volume 10.0  9.0-12.2  fL


 


Immature Granulocyte % (Auto) 1   %


 


Neutrophils (%) (Auto) 81 H 42-75  %


 


Lymphocytes (%) (Auto) 14  12-44  %


 


Monocytes (%) (Auto) 5  0-12  %


 


Eosinophils (%) (Auto) 0  0-10  %


 


Basophils (%) (Auto) 0  0-10  %


 


Neutrophils # (Auto)


 13.2 H


 1.8-7.8


10^3/uL


 


Lymphocytes # (Auto)


 2.2 


 1.0-4.0


10^3/uL


 


Monocytes # (Auto)


 0.8 


 0.0-1.0


10^3/uL


 


Eosinophils # (Auto)


 0.1 


 0.0-0.3


10^3/uL


 


Basophils # (Auto)


 0.0 


 0.0-0.1


10^3/uL


 


Immature Granulocyte # (Auto)


 0.1 


 0.0-0.1


10^3/uL


 


Neutrophils % (Manual) 85   %


 


Lymphocytes % (Manual) 12   %


 


Monocytes % (Manual) 3   %


 


Platelet Estimate GIANTS SEEN   


 


Polychromasia SLIGHT   


 


Poikilocytosis SLIGHT   











OB - Assessment/Plan/Diagnosis


Assessment


Assessment:  active labor


Admission Dx


30 yo  @ 39 weeks


Active labor


GBS neg


Admission Status:  Inpatient Order (span 2 midnights)


Reason for Inpatient Admission:  


Active labor at 39 weeks





Plan


Plan:  Expectant Management











SANGITA QUEVEDO DO            2022 05:32

## 2022-06-22 NOTE — OB LABOR & DELIVERY RECORD
L&D History


Date of Service


Date of Service:  2022





History


Expected Date of Delivery:  2022


Gestational Age in Weeks:  39


Hx :  2


Hx Para:  1





Pregnancy Complications


Prenatal Events:  Routine Prenatal care


Operative Indications (Cesarea:  N/A-Vaginal Delivery


Intrapartal Events:  None





L&D Stage1


Stage One


Onset of Labor - Date:  2022





Monitors and Tracing


Fetal Monitor Mode:  External


Fetal Heart Rate:  145


Fetal Monitor Accelerations:  Uniform


Fetal Monitor Decelerations:  Variable


Fetal Station:  -2


Long Term Variability:  Average (6-10)


Short Term Variability:  Present


Fetal Presentation:  Vertex


Vital Signs





                          VS - Last 72 Hours, by Label








 22





 00:00 00:39 02:30 02:35


 


Temp  36.8 36.6 


 


Pulse 85 67 100 115


 


Resp 18 18 18 18


 


B/P (MAP) 122/70 (87)  108/57 (74) 113/60 (77)


 


Pulse Ox 97 98 99 99


 


O2 Delivery Room Air Room Air Room Air Room Air


 


    





 22





 02:38 02:41 02:45 02:50


 


Pulse 103 99 120 99


 


Resp 18 18 18 18


 


B/P (MAP) 111/59 (76) 104/56 (72) 103/56 (72) 108/56 (73)


 


Pulse Ox 98  98 98


 


O2 Delivery Room Air Room Air Room Air Room Air





 22





 02:55 03:00 03:15 03:30


 


Pulse 89 88 107 71


 


Resp 18 18 18 18


 


B/P (MAP) 110/59 (76) 106/57 (73) 110/61 (77) 106/59 (75)


 


Pulse Ox 99 99 100 99


 


O2 Delivery Room Air Room Air Room Air Room Air





 22 





 03:45 04:00 04:15 


 


Pulse 74 76 74 


 


Resp 18 18 18 


 


B/P (MAP)  94/47 (63) 104/59 (74) 


 


Pulse Ox 97 97 99 


 


O2 Delivery Room Air Room Air Room Air 











Rupture of Membranes


Spontaneous Ruture of Membrane:  No


Amniotic Membrane Rupture Time:  05:03


Amniotic Membrane Fluid Desc.:  Port Wine (light port wine appearance to fluid)


Vaginal Bleeding Description:  Normal Show





Induction/Anesthesia


Epidural Cath Placement - Time:  0224





Progress/Notes


Called by RN at 0435 for repetitive variable decelerations.  Patient intact and 

dialated to 9 cm.  Upon arrival, there were deep variable decels with 

contractions.  AROM was performed and the patient was then complete.





L&D Stage2


Stage Two


Stage II Date:  2022





Monitors and Tracing


Fetal Monitor Mode:  External


Fetal Heart Rate:  145


Fetal Monitor Accelerations:  Uniform


Fetal Monitor Decelerations:  Variable


Long Term Variability:  Average (6-10)


Short Term Variability:  Present


Fetal Position:  Right Occiput Anterior


Fetal Presentation:  Vertex





Cord Descript/Complications


Fetal Cord Vessel Description:  3 Vessels





Delivery Type


Infant Delivery Method:  Spontaneous Vaginal


Anterior Shoulder:  Left





Episiotomy/Perineal Laceration


Laceraction(s)/Extensions:  Yes


Episiotomy Description:  Perineal Extension/lac, 1st degree


Degree (describe repair)


laceration repaired using 3-0 rapide in usual fashion





Condition of Infant


Delivery


1 minute Apgar Comment:  


8


5 minute Apgar Comment:  


9


Notes


Live male infant weight 5lbs 3 oz





Condition of Infant


Condition of Infant:  Living


Exam:  No Observed Abnormalities





Resuscitation


Resuscitation:  N/A - Spontaneous Resp





L&D Stage3


Stage Three


Stage III Date:  2022





Pictocin


Pitocin Administration Comment:  


30 mu wide open after delivery of placenta





Placenta Delivery


Placenta Delivery:  Spontaneous





Delivery Summary


Summary


Estimated blood loss (mL):  250


Attending at delivery:


Sangita Quevedo DO





Condition of Delivery


Examined:  Cervix Examined, Uterus Explored


Post Partum Hemorrhage:  No


Condition of Mother


stable


Condition of Infant (s)


stable











SANGITA QUEVEDO DO            2022 05:35

## 2022-06-23 VITALS — SYSTOLIC BLOOD PRESSURE: 106 MMHG | DIASTOLIC BLOOD PRESSURE: 53 MMHG

## 2022-06-23 VITALS — SYSTOLIC BLOOD PRESSURE: 123 MMHG | DIASTOLIC BLOOD PRESSURE: 63 MMHG

## 2022-06-23 VITALS — DIASTOLIC BLOOD PRESSURE: 57 MMHG | SYSTOLIC BLOOD PRESSURE: 105 MMHG

## 2022-06-23 VITALS — SYSTOLIC BLOOD PRESSURE: 102 MMHG | DIASTOLIC BLOOD PRESSURE: 56 MMHG

## 2022-06-23 LAB
BASOPHILS # BLD AUTO: 0 10^3/UL (ref 0–0.1)
BASOPHILS NFR BLD AUTO: 0 % (ref 0–10)
EOSINOPHIL # BLD AUTO: 0.2 10^3/UL (ref 0–0.3)
EOSINOPHIL NFR BLD AUTO: 1 % (ref 0–10)
HCT VFR BLD CALC: 35 % (ref 35–52)
HGB BLD-MCNC: 11.4 G/DL (ref 11.5–16)
LYMPHOCYTES # BLD AUTO: 2.3 10^3/UL (ref 1–4)
LYMPHOCYTES NFR BLD AUTO: 21 % (ref 12–44)
MANUAL DIFFERENTIAL PERFORMED BLD QL: NO
MCH RBC QN AUTO: 31 PG (ref 25–34)
MCHC RBC AUTO-ENTMCNC: 33 G/DL (ref 32–36)
MCV RBC AUTO: 94 FL (ref 80–99)
MONOCYTES # BLD AUTO: 0.5 10^3/UL (ref 0–1)
MONOCYTES NFR BLD AUTO: 5 % (ref 0–12)
NEUTROPHILS # BLD AUTO: 8.2 10^3/UL (ref 1.8–7.8)
NEUTROPHILS NFR BLD AUTO: 73 % (ref 42–75)
PLATELET # BLD: 255 10^3/UL (ref 130–400)
PMV BLD AUTO: 10.2 FL (ref 9–12.2)
WBC # BLD AUTO: 11.2 10^3/UL (ref 4.3–11)

## 2022-06-23 RX ADMIN — BENZONATATE SCH MG: 100 CAPSULE ORAL at 22:13

## 2022-06-23 RX ADMIN — BENZONATATE SCH MG: 100 CAPSULE ORAL at 10:28

## 2022-06-23 RX ADMIN — IBUPROFEN SCH MG: 600 TABLET ORAL at 16:36

## 2022-06-23 RX ADMIN — WITCH HAZEL PRN EA: 500 SOLUTION RECTAL; TOPICAL at 10:28

## 2022-06-23 RX ADMIN — IBUPROFEN SCH MG: 600 TABLET ORAL at 05:22

## 2022-06-23 RX ADMIN — VITAMIN A ACETATE, .BETA.-CAROTENE, ASCORBIC ACID, CHOLECALCIFEROL, .ALPHA.-TOCOPHEROL ACETATE, DL-, THIAMINE MONONITRATE, RIBOFLAVIN, NIACINAMIDE, PYRIDOXINE HYDROCHLORIDE, FOLIC ACID, CYANOCOBALAMIN, CALCIUM CARBONATE, FERROUS FUMARATE, ZINC OXIDE, AND CUPRIC OXIDE SCH EA: 2000; 2000; 120; 400; 22; 1.84; 3; 20; 10; 1; 12; 200; 27; 25; 2 TABLET ORAL at 10:14

## 2022-06-23 RX ADMIN — IBUPROFEN SCH MG: 600 TABLET ORAL at 10:56

## 2022-06-23 RX ADMIN — IBUPROFEN SCH MG: 600 TABLET ORAL at 22:13

## 2022-06-23 RX ADMIN — DOCUSATE SODIUM SCH MG: 100 CAPSULE ORAL at 10:14

## 2022-06-23 RX ADMIN — FERROUS SULFATE TAB 325 MG (65 MG ELEMENTAL FE) SCH MG: 325 (65 FE) TAB at 10:14

## 2022-06-23 RX ADMIN — DOCUSATE SODIUM SCH MG: 100 CAPSULE ORAL at 22:12

## 2022-06-23 NOTE — ANESTHESIA-REGIONAL POST-OP
Regional


Patient Condition


Mental Status:  Alert, Oriented x3


Circulation:  Same as Pre-Op


Headache:  Absent


Sensation:  Full Recovery


Motor Block:  Absent





Post Op Complications


Complications


None





Follow Up Care/Instructions


Patient Instructions


None needed.





Anesthesia/Patient Condition


Patient is doing well, no complaints, stable vital signs, no apparent adverse 

anesthesia problems.   


No complications reported per nursing.


D/C home per [facility] Criteria:  Yes











ANAID CHO CRNA           Jun 23, 2022 07:22

## 2022-06-23 NOTE — POSTPARTUM PROGRESS NOTE
Postpartum Note


Postpartum Note


Postpartum Day # 1





Subjective:


Patient is having cough and congestion this am. Ambulating, voiding. Tolerating 

a regular diet without nausea or vomiting. Normal lochia. Pain is well 

controlled with oral pain medications. 





Objective:








Physical Exam:


General - Alert and oriented, no apparent distress


Abdomen - Soft, appropriately tender to palpation, non-distended, fundus firm at

umbilicus


Extremities - no edema, negative Shelton's bilaterally 





Assessment:


PPD 1 NVD


Nasal and head congestion w/ new cough








Plan:


Started on Robitussin DM and tesselon perles as needed.


Routine postpartum care.


Encourage breast feeding.


Encourage ambulation.


Ferrous sulfate supplementation.


Plan for discharge tomorrow





Vitals - Labs


Vital Signs - I&O





Vital Signs








  Date Time  Temp Pulse Resp B/P (MAP) Pulse Ox O2 Delivery O2 Flow Rate FiO2


 


6/23/22 05:22 36.3 73 18 102/56 (71) 97 Room Air  


 


6/22/22 21:29 36.8 82 18 108/57 (74) 99 Room Air  


 


6/22/22 16:15 36.5 77 18 116/65 (82) 98 Room Air  


 


6/22/22 12:45 36.3 78 18 118/53 (74) 98 Room Air  











Labs


Laboratory Tests


6/23/22 07:33: 


White Blood Count 11.2H, Red Blood Count 3.70L, Hemoglobin 11.4L, Hematocrit 35,

Mean Corpuscular Volume 94, Mean Corpuscular Hemoglobin 31, Mean Corpuscular 

Hemoglobin Concent 33, Red Cell Distribution Width 14.1, Platelet Count 255, 

Mean Platelet Volume 10.2, Immature Granulocyte % (Auto) 1, Neutrophils (%) 

(Auto) 73, Lymphocytes (%) (Auto) 21, Monocytes (%) (Auto) 5, Eosinophils (%) 

(Auto) 1, Basophils (%) (Auto) 0, Neutrophils # (Auto) 8.2H, Lymphocytes # 

(Auto) 2.3, Monocytes # (Auto) 0.5, Eosinophils # (Auto) 0.2, Basophils # (Auto)

0.0, Immature Granulocyte # (Auto) 0.1











SANGITA QUEVEDO DO            Jun 23, 2022 09:37

## 2022-06-23 NOTE — DISCHARGE INST-WOMEN'S SERVICE
Discharge Inst-Women's Serv


Depart Medication/Instructions


New, Converted or Re-Newed RX:  Transmitted to Pharmacy


Final Diagnosis


PPD 2 NVD


Problems Reviewed?:  Yes





Consults/Follow Up


Additional Follow Up:  Yes


Orders/Referrals


Dr. Quevedo in 6 weeks





Activity


Activity:  Activity as Tolerated


Driving Instructions:  No Driving for 1 Week


NO SMOKING:  NO SMOKING


Nothing Inside Vagina:  No Douching, No [place], No Tampons





Diet


Discharge Diet:  No Restrictions


Symptoms to Report to :  Bleeding Excessive, Pain Increased, Fever Over 101 

Degrees F, Vaginal Bleeding Increase, Questions/Concerns


For Any Problems or Questions:  Contact Your Physician











SANGITA QUEVEDO DO            Jun 23, 2022 21:39

## 2022-06-24 VITALS — DIASTOLIC BLOOD PRESSURE: 58 MMHG | SYSTOLIC BLOOD PRESSURE: 114 MMHG

## 2022-06-24 VITALS — SYSTOLIC BLOOD PRESSURE: 132 MMHG | DIASTOLIC BLOOD PRESSURE: 64 MMHG

## 2022-06-24 RX ADMIN — IBUPROFEN SCH MG: 600 TABLET ORAL at 10:18

## 2022-06-24 RX ADMIN — VITAMIN A ACETATE, .BETA.-CAROTENE, ASCORBIC ACID, CHOLECALCIFEROL, .ALPHA.-TOCOPHEROL ACETATE, DL-, THIAMINE MONONITRATE, RIBOFLAVIN, NIACINAMIDE, PYRIDOXINE HYDROCHLORIDE, FOLIC ACID, CYANOCOBALAMIN, CALCIUM CARBONATE, FERROUS FUMARATE, ZINC OXIDE, AND CUPRIC OXIDE SCH EA: 2000; 2000; 120; 400; 22; 1.84; 3; 20; 10; 1; 12; 200; 27; 25; 2 TABLET ORAL at 08:33

## 2022-06-24 RX ADMIN — DOCUSATE SODIUM SCH MG: 100 CAPSULE ORAL at 08:32

## 2022-06-24 RX ADMIN — FERROUS SULFATE TAB 325 MG (65 MG ELEMENTAL FE) SCH MG: 325 (65 FE) TAB at 08:32

## 2022-06-24 RX ADMIN — IBUPROFEN SCH MG: 600 TABLET ORAL at 03:53

## 2022-06-24 RX ADMIN — BENZONATATE SCH MG: 100 CAPSULE ORAL at 08:32

## 2022-06-24 NOTE — POSTPARTUM PROGRESS NOTE
Postpartum Note


Postpartum Note


Postpartum Day # 2





Subjective:


Patient is without complaints. Ambulating, voiding. Tolerating a regular diet 

without nausea or vomiting. Normal lochia. Pain is well controlled with oral 

pain medications. 





Objective:








Physical Exam:


General - Alert and oriented, no apparent distress


Abdomen - Soft, appropriately tender to palpation, non-distended, fundus firm at

umbilicus


Extremities - no edema, negative Shelton's bilaterally 





Assessment:


PPD 2 NVD








Plan:


Routine postpartum care.


Encourage breast feeding.


Encourage ambulation.


Ferrous sulfate supplementation.


Plan for discharge today





Vitals - Labs


Vital Signs - I&O





Vital Signs








  Date Time  Temp Pulse Resp B/P (MAP) Pulse Ox O2 Delivery O2 Flow Rate FiO2


 


6/24/22 03:56 36.9 69 18 114/58 (76) 97 Room Air  


 


6/23/22 22:33 36.8 70 18 123/63 (83) 97 Room Air  


 


6/23/22 16:36 36.6 78 18 105/57 (73) 97 Room Air  


 


6/23/22 10:15 36.5 77 18 106/53 (70) 97 Room Air  











Labs


Laboratory Tests


6/23/22 07:33: 


White Blood Count 11.2H, Red Blood Count 3.70L, Hemoglobin 11.4L, Hematocrit 35,

Mean Corpuscular Volume 94, Mean Corpuscular Hemoglobin 31, Mean Corpuscular 

Hemoglobin Concent 33, Red Cell Distribution Width 14.1, Platelet Count 255, 

Mean Platelet Volume 10.2, Immature Granulocyte % (Auto) 1, Neutrophils (%) 

(Auto) 73, Lymphocytes (%) (Auto) 21, Monocytes (%) (Auto) 5, Eosinophils (%) (A

uto) 1, Basophils (%) (Auto) 0, Neutrophils # (Auto) 8.2H, Lymphocytes # (Auto) 

2.3, Monocytes # (Auto) 0.5, Eosinophils # (Auto) 0.2, Basophils # (Auto) 0.0, 

Immature Granulocyte # (Auto) 0.1











SANGITA QUEVEDO DO            Jun 24, 2022 07:27

## 2022-09-02 ENCOUNTER — HOSPITAL ENCOUNTER (OUTPATIENT)
Dept: HOSPITAL 75 - RAD | Age: 32
End: 2022-09-02
Attending: OTOLARYNGOLOGY
Payer: COMMERCIAL

## 2022-09-02 DIAGNOSIS — R59.0: ICD-10-CM

## 2022-09-02 DIAGNOSIS — K11.8: Primary | ICD-10-CM

## 2022-09-02 PROCEDURE — 76536 US EXAM OF HEAD AND NECK: CPT

## 2022-09-02 NOTE — DIAGNOSTIC IMAGING REPORT
INDICATION: Right submandibular lump.



FINDINGS: Ultrasound of the right submandibular gland shows

normal size and vascularity of both submandibular glands which

are symmetric in size. There are no masses or pathologic fluid

collections seen.



IMPRESSION: Normal ultrasound of the submandibular glands,

bilaterally. 



Dictated by: 



  Dictated on workstation # [id number]

## 2022-09-08 ENCOUNTER — HOSPITAL ENCOUNTER (OUTPATIENT)
Dept: HOSPITAL 75 - RAD | Age: 32
End: 2022-09-08
Attending: OTOLARYNGOLOGY
Payer: COMMERCIAL

## 2022-09-08 DIAGNOSIS — R59.0: Primary | ICD-10-CM

## 2022-09-08 PROCEDURE — 70491 CT SOFT TISSUE NECK W/DYE: CPT

## 2022-09-08 NOTE — DIAGNOSTIC IMAGING REPORT
PROCEDURE: CT neck soft tissue with contrast.



TECHNIQUE: Multiple contiguous axial images were obtained through

the neck after the administration of contrast. Auto Exposure

Controls were utilized during the CT exam to meet ALARA standards

for radiation dose reduction. 



INDICATION:  Right neck adenopathy.



COMPARISON: December 11, 2018.



FINDINGS:

The visualized intracranial contents demonstrate no findings of

abnormal enhancement or mass effect. The orbits are unremarkable.

There is a small mucus  retention cyst in the right maxillary

sinus. The mastoids appear clear



The posterior nasopharynx and oropharynx are appropriate

symmetric. There is no displacement of the parapharyngeal fat

planes. The base of the tongue appears appropriate. There is no

evidence of an abnormal process within the tonsillar pillars.

There is no abnormal process within the prevertebral or

retropharyngeal space.  There is no thickening of the epiglottis.

The vallecula and piriform sinuses are aerated. Vocal folds are

symmetric. Subglottic trachea is unremarkable.



The parotid, submandibular and thyroid glands appear normal.



There are tiny bilateral cervical chain lymph nodes but none

which appear pathologically enlarged by CT criteria.



The vascular structures of the neck demonstrate no arterial

stenosis or findings of jugular vein thrombosis.



The lung apices are clear. Cervical spine demonstrates reversal

of the cervical lordosis with normal alignment. No acute or

suspicious osseous abnormality.



IMPRESSION:



1. No identifiable pathologically enlarged cervical lymph nodes.

Multiple cervical chain lymph nodes demonstrate short axis

measurements of less than 10 mm.

2. Aerodigestive tract is appropriately symmetric.

3. No acute inflammatory process evident.



Dictated by: 



  Dictated on workstation # [id number]

## 2023-01-18 NOTE — DISCHARGE INST-WOMEN'S SERVICE
Discharge Inst-Women's Serv


Depart Medication/Instructions


New, Converted or Re-Newed RX:  RX on Chart





Consults/Follow Up


Additional Follow Up:  Yes


Orders/Referrals


Dr. Quevedo in 6 weeks





Activity


Activity:  Activity as Tolerated


Driving Instructions:  No Driving for 1 Week


NO SMOKING:  NO SMOKING


Nothing Inside Vagina:  No Douching, No [place], No Tampons





Diet


Discharge Diet:  No Restrictions


Symptoms to Report to :  Bleeding Excessive, Pain Increased, Fever Over 101 

Degrees F, Vaginal Bleeding Increase, Questions/Concerns


For Any Problems or Questions:  Contact Your Physician





Skin/Wound Care


Bathing Instructions:  Shower (or sitz baths x 2 weeks)











SANGITA QUEVEDO DO Jul 4, 2018 17:26 No